# Patient Record
Sex: FEMALE | URBAN - METROPOLITAN AREA
[De-identification: names, ages, dates, MRNs, and addresses within clinical notes are randomized per-mention and may not be internally consistent; named-entity substitution may affect disease eponyms.]

---

## 2019-04-11 ENCOUNTER — APPOINTMENT (RX ONLY)
Dept: URBAN - METROPOLITAN AREA CLINIC 58 | Facility: CLINIC | Age: 39
Setting detail: DERMATOLOGY
End: 2019-04-11

## 2019-04-11 DIAGNOSIS — L65.8 OTHER SPECIFIED NONSCARRING HAIR LOSS: ICD-10-CM | Status: WORSENING

## 2019-04-11 DIAGNOSIS — Z41.9 ENCOUNTER FOR PROCEDURE FOR PURPOSES OTHER THAN REMEDYING HEALTH STATE, UNSPECIFIED: ICD-10-CM

## 2019-04-11 DIAGNOSIS — D22 MELANOCYTIC NEVI: ICD-10-CM | Status: UNCHANGED

## 2019-04-11 PROBLEM — E13.9 OTHER SPECIFIED DIABETES MELLITUS WITHOUT COMPLICATIONS: Status: ACTIVE | Noted: 2019-04-11

## 2019-04-11 PROBLEM — D22.62 MELANOCYTIC NEVI OF LEFT UPPER LIMB, INCLUDING SHOULDER: Status: ACTIVE | Noted: 2019-04-11

## 2019-04-11 PROBLEM — D22.39 MELANOCYTIC NEVI OF OTHER PARTS OF FACE: Status: ACTIVE | Noted: 2019-04-11

## 2019-04-11 PROCEDURE — ? COUNSELING

## 2019-04-11 PROCEDURE — ? MEDICAL CONSULTATION: COOLSCULPTING

## 2019-04-11 PROCEDURE — 99202 OFFICE O/P NEW SF 15 MIN: CPT

## 2019-04-11 PROCEDURE — ? PRESCRIPTION

## 2019-04-11 RX ORDER — FLUOCINONIDE 0.5 MG/ML
PEA SIZE SOLUTION TOPICAL QD
Qty: 1 | Refills: 0 | Status: ERX | COMMUNITY
Start: 2019-04-11

## 2019-04-11 RX ADMIN — FLUOCINONIDE PEA SIZE: 0.5 SOLUTION TOPICAL at 00:00

## 2019-04-11 ASSESSMENT — LOCATION DETAILED DESCRIPTION DERM
LOCATION DETAILED: RIGHT CENTRAL FRONTAL SCALP
LOCATION DETAILED: RIGHT SUPERIOR OCCIPITAL SCALP
LOCATION DETAILED: LEFT VENTRAL PROXIMAL FOREARM
LOCATION DETAILED: RIGHT SUPERIOR PARIETAL SCALP
LOCATION DETAILED: LEFT CENTRAL FRONTAL SCALP
LOCATION DETAILED: LEFT SUPERIOR PARIETAL SCALP
LOCATION DETAILED: EPIGASTRIC SKIN
LOCATION DETAILED: LEFT VENTRAL PROXIMAL FOREARM
LOCATION DETAILED: LEFT SUPERIOR PARIETAL SCALP
LOCATION DETAILED: LEFT INFERIOR CENTRAL MALAR CHEEK

## 2019-04-11 ASSESSMENT — LOCATION SIMPLE DESCRIPTION DERM
LOCATION SIMPLE: SCALP
LOCATION SIMPLE: RIGHT OCCIPITAL SCALP
LOCATION SIMPLE: LEFT SCALP
LOCATION SIMPLE: SCALP
LOCATION SIMPLE: LEFT CHEEK
LOCATION SIMPLE: ABDOMEN
LOCATION SIMPLE: LEFT FOREARM
LOCATION SIMPLE: LEFT FOREARM
LOCATION SIMPLE: RIGHT SCALP

## 2019-04-11 ASSESSMENT — LOCATION ZONE DERM
LOCATION ZONE: SCALP
LOCATION ZONE: FACE
LOCATION ZONE: TRUNK
LOCATION ZONE: ARM
LOCATION ZONE: SCALP
LOCATION ZONE: ARM

## 2019-07-22 ENCOUNTER — APPOINTMENT (RX ONLY)
Dept: URBAN - METROPOLITAN AREA CLINIC 59 | Facility: CLINIC | Age: 39
Setting detail: DERMATOLOGY
End: 2019-07-22

## 2019-07-22 DIAGNOSIS — L21.8 OTHER SEBORRHEIC DERMATITIS: ICD-10-CM

## 2019-07-22 DIAGNOSIS — L65.8 OTHER SPECIFIED NONSCARRING HAIR LOSS: ICD-10-CM | Status: UNCHANGED

## 2019-07-22 PROCEDURE — ? COUNSELING

## 2019-07-22 PROCEDURE — ? INTRALESIONAL KENALOG

## 2019-07-22 PROCEDURE — ? TREATMENT REGIMEN

## 2019-07-22 PROCEDURE — 99214 OFFICE O/P EST MOD 30 MIN: CPT | Mod: 25

## 2019-07-22 PROCEDURE — 11901 INJECT SKIN LESIONS >7: CPT

## 2019-07-22 PROCEDURE — ? PRESCRIPTION

## 2019-07-22 RX ORDER — KETOCONAZOLE 20.5 MG/ML
SHAMPOO, SUSPENSION TOPICAL
Qty: 1 | Refills: 0 | Status: ERX | COMMUNITY
Start: 2019-07-22

## 2019-07-22 RX ADMIN — KETOCONAZOLE: 20.5 SHAMPOO, SUSPENSION TOPICAL at 21:19

## 2019-07-22 ASSESSMENT — LOCATION DETAILED DESCRIPTION DERM
LOCATION DETAILED: LEFT SUPERIOR MEDIAL FOREHEAD
LOCATION DETAILED: RIGHT SUPERIOR MEDIAL FOREHEAD
LOCATION DETAILED: RIGHT SUPERIOR FOREHEAD
LOCATION DETAILED: RIGHT MEDIAL FRONTAL SCALP
LOCATION DETAILED: RIGHT CENTRAL FRONTAL SCALP
LOCATION DETAILED: LEFT MEDIAL FRONTAL SCALP
LOCATION DETAILED: MID-FRONTAL SCALP

## 2019-07-22 ASSESSMENT — LOCATION SIMPLE DESCRIPTION DERM
LOCATION SIMPLE: LEFT SCALP
LOCATION SIMPLE: RIGHT SCALP
LOCATION SIMPLE: ANTERIOR SCALP
LOCATION SIMPLE: RIGHT FOREHEAD
LOCATION SIMPLE: LEFT FOREHEAD

## 2019-07-22 ASSESSMENT — LOCATION ZONE DERM
LOCATION ZONE: FACE
LOCATION ZONE: SCALP

## 2019-07-22 ASSESSMENT — SEVERITY ASSESSMENT: HOW SEVERE IS THIS PATIENT'S CONDITION?: MILD

## 2019-07-22 NOTE — PROCEDURE: INTRALESIONAL KENALOG
Medical Necessity Clause: This procedure was medically necessary because the lesions that were treated were:
Concentration Of Solution Injected (Mg/Ml): 3.0
Lot # (Optional): -[id number]
Expiration Date (Optional): October 1, 2020
Include Z78.9 (Other Specified Conditions Influencing Health Status) As An Associated Diagnosis?: No
Kenalog Preparation: Kenalog
Consent: The risks of atrophy were reviewed with the patient.
X Size Of Lesion In Cm (Optional): 0
Detail Level: Zone
Total Volume Injected (Ccs- Only Use Numbers And Decimals): 6.0
Administered By (Optional): Hong Mo MA

## 2019-07-22 NOTE — PROCEDURE: MIPS QUALITY
Quality 226: Preventive Care And Screening: Tobacco Use: Screening And Cessation Intervention: Patient screened for tobacco use and is an ex/non-smoker
Quality 130: Documentation Of Current Medications In The Medical Record: Current Medications Documented
Detail Level: Detailed
Quality 110: Preventive Care And Screening: Influenza Immunization: Influenza Immunization Ordered or Recommended, but not Administered due to system reason
Quality 431: Preventive Care And Screening: Unhealthy Alcohol Use - Screening: Patient screened for unhealthy alcohol use using a single question and scores 2 or greater episodes per year and brief intervention occurred